# Patient Record
(demographics unavailable — no encounter records)

---

## 2025-02-12 NOTE — HISTORY OF PRESENT ILLNESS
[FreeTextEntry1] : cpe, headache [de-identified] : Notes an unusual intermittent discomfort of his left temple. He does not believe it is a tension headache. He denies any nausea/vomiting/dizziness/sensitivity to light/sound. Does not want to use medication to treat the discomfort.

## 2025-02-12 NOTE — HEALTH RISK ASSESSMENT
[Audit-CScore] : 5 [de-identified] : works out 4-5 times a week [de-identified] : good right now -  [[id number]] : 0 [HIVComments] : will add today [HepatitisCComments] : screened in the past

## 2025-02-12 NOTE — PHYSICAL EXAM
[Normal Sclera/Conjunctiva] : normal sclera/conjunctiva [EOMI] : extraocular movements intact [Normal] : normal rate, regular rhythm, normal S1 and S2 and no murmur heard [No Varicosities] : no varicosities [No Edema] : there was no peripheral edema [Soft] : abdomen soft [Non Tender] : non-tender [Non-distended] : non-distended [No Masses] : no abdominal mass palpated [No HSM] : no HSM [Normal Posterior Cervical Nodes] : no posterior cervical lymphadenopathy [Normal Anterior Cervical Nodes] : no anterior cervical lymphadenopathy [Coordination Grossly Intact] : coordination grossly intact [No Focal Deficits] : no focal deficits [Normal Gait] : normal gait [Normal Affect] : the affect was normal [Normal Insight/Judgement] : insight and judgment were intact [de-identified] : CN II-XII grossly intact

## 2025-02-12 NOTE — HISTORY OF PRESENT ILLNESS
[FreeTextEntry1] : cpe, headache [de-identified] : Notes an unusual intermittent discomfort of his left temple. He does not believe it is a tension headache. He denies any nausea/vomiting/dizziness/sensitivity to light/sound. Does not want to use medication to treat the discomfort.

## 2025-02-12 NOTE — HEALTH RISK ASSESSMENT
[Audit-CScore] : 5 [de-identified] : works out 4-5 times a week [de-identified] : good right now -  [[id number]] : 0 [HIVComments] : will add today [HepatitisCComments] : screened in the past

## 2025-02-12 NOTE — PHYSICAL EXAM
[Normal Sclera/Conjunctiva] : normal sclera/conjunctiva [EOMI] : extraocular movements intact [Normal] : normal rate, regular rhythm, normal S1 and S2 and no murmur heard [No Varicosities] : no varicosities [No Edema] : there was no peripheral edema [Soft] : abdomen soft [Non Tender] : non-tender [Non-distended] : non-distended [No Masses] : no abdominal mass palpated [No HSM] : no HSM [Normal Posterior Cervical Nodes] : no posterior cervical lymphadenopathy [Normal Anterior Cervical Nodes] : no anterior cervical lymphadenopathy [Coordination Grossly Intact] : coordination grossly intact [No Focal Deficits] : no focal deficits [Normal Gait] : normal gait [Normal Affect] : the affect was normal [Normal Insight/Judgement] : insight and judgment were intact [de-identified] : CN II-XII grossly intact